# Patient Record
(demographics unavailable — no encounter records)

---

## 2025-01-10 NOTE — CARDIOLOGY SUMMARY
[de-identified] : 01/2025: sinus rhythm, normal intervals [de-identified] : JAMIE:  1. Left ventricular cavity is normal in size. Left ventricular systolic function is normal with an ejection fraction visually estimated at 60 to 65 %.  2. No atrial septal defect detected.  3. Agitated saline injection was negative for intracardiac shunt.  4. Lipomatous interatrial septal hypertrophy present.  5. No evidence of left atrial or left atrial appendage thrombus.  6. There is calcification of the mitral valve annulus.  7. Trileaflet aortic valve with reduced systolic excursion. There is mild calcification of the aortic valve leaflets. Mild aortic stenosis.  TTE Limited W or WO Ultrasound Enhancing Agent (10.21.24 @ 11:38) >  1. Limited TTE for agitated saline study.  2. Agitated saline injection was negative forintracardiac shunt.  < end of copied text > < from: TTE W or WO Ultrasound Enhancing Agent (10.08.24 @ 15:17) >  1. Technically difficult image quality.  2. Left ventricular cavity is normal in size. Left ventricular wall thickness is normal. Left ventricular systolic function is hyperdynamic with an ejection fraction of 82 % by Estrella's method of disks with an ejection fraction visually estimated at >75 %. There are no regional wall motion abnormalities seen.  3. Analysis of left ventricular diastolic function and filling pressure is made challenging by the presence of mitral annular calcification.  4. Hyperdynamic left ventricular systolic function with intra-cavitary gradient of 48 mmHg.  5. There is severe calcification of the mitral valve annulus.  6. Moderate mitral valve stenosis. A hyperdynamic left ventricle contributes to the elevated transmitral gradient.  7. MVA by continuity equation is 1.5 cm2.  8. Mild aortic stenosis.  9. The right ventricle is not well visualized. probably normal right ventricular cavity size and probably normal right ventricular systolic function. 10. The inferior vena cava is normal in size (normal <2.1cm) with normal inspiratory collapse (normal >50%) consistent with normal right atrial pressure (~3, range 0-5mmHg). 11. Pulmonary arterysystolic pressure could not be estimated. 12. No prior echocardiogram is available for comparison.

## 2025-01-10 NOTE — DISCUSSION/SUMMARY
[EKG obtained to assist in diagnosis and management of assessed problem(s)] : EKG obtained to assist in diagnosis and management of assessed problem(s) [FreeTextEntry1] : Patient presents for: + Stage B valvular disease (Mild MS, Mild AS on JAMIE 2024 @ [facility]) Severe MAC Hx of CVA with right hemiparesis s/p ILR (No events 12/2024 interrogation) Morbid obesity hx of tobacco use     Orders placed including imaging/meds:  - Carotid duplex - HERBERT/PVR - lp(a), lipid panel repeat, apo B - goal LDL <70 - recommend low dose bblocker toprol xl 25mg qday - recommend atorvastatin 20mg qday, c/w ASA 81mg qday - ascvd 6.17%  10/2024: LDL-1 167, Non-. , , HDL 38 hba1c 6.1% 10/2024   Patients willingness to continue abstienence is discussed. They will make effort to abstain. We discussed tobacco use of any kind, its impact on the Cardiovascular system including atherosclerosis and inflammatory changes on Cardiovascular system. I've offered them advice on medications, resources online and positive reinforcement including methods on quitting. Spent between 3-10 minutes counseling. We agreed at our next appointment the patient should be either significantly reduced from tobacco products or completely in abstinence.  Resources made available to the patient         Patient warm and euvolemic. There is no evidence of active ACS, decompensated HF, uncontrolled arrhythmias or significant valvular heart disease at present. EKG is non-ischemic. We went over the EKG in office today, all questions answered. Vitals reviewed. Cardiac testing recent reviewed.   I've asked the patient to keep a blood pressure journal and report back if SBP >130 or DBP >90 on 2-3 separate random occasions. The patient is aware of alarm cardiac type symptoms, will call with questions or concerns and is aware to activate 911 and/or present to the closest emergency department if concerns.   Follow up: with me in  6 months or sooner as requested.     I strongly encouraged the patient to pursue the following preventative cardiology, lifestyle modifications which are necessary for long-term cardiovascular health. The following is recommended: Advised a mediterranean and/or plant predominant, high fiber, limited salt (ideal <2 g/day), low fat diet, stress reduction/psychosomatic management, sleep hygiene/PATSY testing and healthy weight loss, annual influenza/preventive vaccines, medication + treatment adherence/compliance, high risk behavior mitigation (I.e. tobacco abstinence, alcohol abstinence, no binge drinking, recreational/illicit drug use abstinence), increased exercise activity aiming for 150 minutes per week of moderate physical activity as per AHA/ACC standard for long term cardiovascular risk reduction.

## 2025-01-10 NOTE — REASON FOR VISIT
[FreeTextEntry1] : 62 y/o female with PMH of substance abuse (cocaine) HTN, HLD, COPD on home of o2 of 3 LNC,  depression, schizophrenia came to the ED complaining of right sided weakness, decrease sensation and slurred speech at [facility] on October 2024.    Admitted for + stroke.  MRI + for  Small acute infarct noted in the left posterior corona radiata/basal ganglia; small acute/subacute infarcts noted in the bilateral posterior frontal parietal region, left greater than right.   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   Vascular opinion -> No acute vascular surgery intervention indicated at this time. Underwent JAMIE: no PFO, normal EF, moderate MS, severe MAC, 2d TTE and ILR placement (12/2024 interrogation No AT/AF/VA). PResents for cardiovascular establishing care.

## 2025-01-10 NOTE — HISTORY OF PRESENT ILLNESS
[FreeTextEntry1] : 64 y/o female with PMH of substance abuse (cocaine) HTN, HLD, COPD on home of o2 of 3 LNC,  depression, schizophrenia came to the ED complaining of right sided weakness, decrease sensation and slurred speech at [facility] on October 2024.    Admitted for + stroke.  MRI + for  Small acute infarct noted in the left posterior corona radiata/basal ganglia; small acute/subacute infarcts noted in the bilateral posterior frontal parietal region, left greater than right.   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   Vascular opinion -> No acute vascular surgery intervention indicated at this time. Underwent JAMIE: no PFO, normal EF, mild-moderate MS (mean grad 6mmHg), severe MAC, 2d TTE and ILR placement (12/2024 interrogation No AT/AF/VA). PResents for cardiovascular establishing care.   has quit smoking since stroke, has urges but plans to remain tobacco abstinent. in rehab and has speech back, still with right hemiparesis and gaining strength, not walking yet still using a walker no cardiac concerns difficulty losing weight, does not want to trial weight loss medications at this time healthier diet now that at rehab. Denies chest pain/pressure, palpitations, irregular and/or rapid heart beat, SOB, BUI, syncope/near syncope, dizziness, orthopnea, PND, cough, edema, f/c, n/v/d, hematuria, or hematochezia. Denies claudication, PAD, venous changes, dizziness, amaroux fugax, vision/sensory changes. underwent dobutamine nuclear stress test 10/2024: no ischemia

## 2025-01-10 NOTE — ASSESSMENT
[FreeTextEntry1] : 64 y/o female with PMH of substance abuse (cocaine) HTN, HLD, COPD on home of o2 of 3 LNC, depression, schizophrenia came to the ED complaining of right sided weakness, decrease sensation and slurred speech at [facility] on October 2024. Admitted for + stroke. MRI + for Small acute infarct noted in the left posterior corona radiata/basal ganglia; small acute/subacute infarcts noted in the bilateral posterior frontal parietal region, left greater than right. 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 Vascular opinion -> No acute vascular surgery intervention indicated at this time. Underwent JAMIE: no PFO, normal EF, moderate MS, severe MAC, 2d TTE and ILR placement (12/2024 interrogation No AT/AF/VA). PResents for cardiovascular establishing care.

## 2025-03-10 NOTE — ASSESSMENT
[FreeTextEntry1] : 64 y/o female with PMH of substance abuse (cocaine) HTN, HLD, COPD on home of o2 of 3 LNC, depression, schizophrenia came to the ED complaining of right sided weakness, decrease sensation and slurred speech at [facility] on October 2024. Admitted for + stroke. MRI + for Small acute infarct noted in the left posterior corona radiata/basal ganglia; small acute/subacute infarcts noted in the bilateral posterior frontal parietal region, left greater than right. 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 Vascular opinion -> No acute vascular surgery intervention indicated at this time. Underwent JAMIE: no PFO, normal EF, moderate MS, severe MAC, 2d TTE and ILR placement (12/2024 interrogation No AT/AF/VA). PResents for cardiovascular establishing care.

## 2025-03-10 NOTE — CARDIOLOGY SUMMARY
[de-identified] : 01/2025: sinus rhythm, normal intervals [de-identified] : JAMIE:  1. Left ventricular cavity is normal in size. Left ventricular systolic function is normal with an ejection fraction visually estimated at 60 to 65 %.  2. No atrial septal defect detected.  3. Agitated saline injection was negative for intracardiac shunt.  4. Lipomatous interatrial septal hypertrophy present.  5. No evidence of left atrial or left atrial appendage thrombus.  6. There is calcification of the mitral valve annulus.  7. Trileaflet aortic valve with reduced systolic excursion. There is mild calcification of the aortic valve leaflets. Mild aortic stenosis.  TTE Limited W or WO Ultrasound Enhancing Agent (10.21.24 @ 11:38) >  1. Limited TTE for agitated saline study.  2. Agitated saline injection was negative forintracardiac shunt.  < end of copied text > < from: TTE W or WO Ultrasound Enhancing Agent (10.08.24 @ 15:17) >  1. Technically difficult image quality.  2. Left ventricular cavity is normal in size. Left ventricular wall thickness is normal. Left ventricular systolic function is hyperdynamic with an ejection fraction of 82 % by Estrella's method of disks with an ejection fraction visually estimated at >75 %. There are no regional wall motion abnormalities seen.  3. Analysis of left ventricular diastolic function and filling pressure is made challenging by the presence of mitral annular calcification.  4. Hyperdynamic left ventricular systolic function with intra-cavitary gradient of 48 mmHg.  5. There is severe calcification of the mitral valve annulus.  6. Moderate mitral valve stenosis. A hyperdynamic left ventricle contributes to the elevated transmitral gradient.  7. MVA by continuity equation is 1.5 cm2.  8. Mild aortic stenosis.  9. The right ventricle is not well visualized. probably normal right ventricular cavity size and probably normal right ventricular systolic function. 10. The inferior vena cava is normal in size (normal <2.1cm) with normal inspiratory collapse (normal >50%) consistent with normal right atrial pressure (~3, range 0-5mmHg). 11. Pulmonary arterysystolic pressure could not be estimated. 12. No prior echocardiogram is available for comparison.

## 2025-03-10 NOTE — PHYSICAL EXAM
[Well Developed] : well developed [No Acute Distress] : no acute distress [Obese] : obese [Normal Conjunctiva] : normal conjunctiva [Normal Venous Pressure] : normal venous pressure [No Carotid Bruit] : no carotid bruit [Normal S1, S2] : normal S1, S2 [No Murmur] : no murmur [No Rub] : no rub [No Gallop] : no gallop [No Respiratory Distress] : no respiratory distress  [Wheeze ____] : wheeze [unfilled] [Soft] : abdomen soft [Non Tender] : non-tender [No Masses/organomegaly] : no masses/organomegaly [Normal Bowel Sounds] : normal bowel sounds [Abnormal Gait] : abnormal gait [No Edema] : no edema [No Cyanosis] : no cyanosis [Normal Radial B/L] : normal radial B/L [No Rash] : no rash [No Skin Lesions] : no skin lesions [Normal Speech] : normal speech [Alert and Oriented] : alert and oriented [Normal memory] : normal memory [de-identified] : 3L NC in place [de-identified] : wheelchair

## 2025-03-10 NOTE — DISCUSSION/SUMMARY
[FreeTextEntry1] : Patient presents for: carotid disease, L>R, Left ECA >75%, ICA 50-69% (2025) + Stage B valvular disease (Mild MS, Mild AS on JAMIE 2024 @ [facility]) Severe MAC Hx of CVA with right hemiparesis s/p ILR (No events 12/2024 interrogation) Morbid obesity hx of tobacco use     Orders placed including imaging/meds: - HERBERT/PVR - Vascular surgery referral due to carotid disease - goal LDL <70 - recommend low dose bblocker toprol xl 25mg qday - recommend atorvastatin 80mg qday, c/w ASA 81mg qday - ascvd 6.17%  10/2024: LDL-1 167, Non-. , , HDL 38 hba1c 6.1% 10/2024   Patient warm and euvolemic. There is no evidence of active ACS, decompensated HF, uncontrolled arrhythmias or significant valvular heart disease at present. EKG is non-ischemic. We went over the EKG in office today, all questions answered. Vitals reviewed. Cardiac testing recent reviewed.  I have instructed the patient to follow a 1200 calories meal plan emphasizing low saturated fat sources and protein with low amount of sodium.  Information on avoiding fast food, fried foods, food with high fat content was suggested, plant based low fat, high fiber diet. We reviewed the efforts of weight reduction identifying 3500 calories in pound of body fat and the need to gradually achieve a long term basis for weight reduction discussed the need to reduce calories for what her current patterns are and to hopefully increase physical activity as well. We discussed menu selection as well as food preparation techniques. Educated patient on 150 minutes of moderate intensity exercise weekly with strength training twice weekly. Encouraged patient to monitor physical activity Discussed the importance of a balanced, healthy diet of nourishing foods and consistent meal pattern for long-term success and health maintenance. Encouraged to increase water intake to facilitate adequate hydration and to cut out sugary drinks and alcohol. Pt educated on eating 4-6 small meals per day, that are high in protein for hunger regulation. Pt educated on cutting out high-sugar content foods sabotaging wt loss. Pt verbalized understanding. we discussed obesity implications with general health and cardiovascular process. I discussed in detail issues that can potentially arise with the cardiovascular and general health system. Consideration of bariatric surgery, weight loss medication and nutritional referral offered.   I've asked the patient to keep a blood pressure journal and report back if SBP >130 or DBP >90 on 2-3 separate random occasions. The patient is aware of alarm cardiac type symptoms, will call with questions or concerns and is aware to activate 911 and/or present to the closest emergency department if concerns.   Follow up: with me in 6 months or sooner as requested.     I strongly encouraged the patient to pursue the following preventative cardiology, lifestyle modifications which are necessary for long-term cardiovascular health. The following is recommended: Advised a mediterranean and/or plant predominant, high fiber, limited salt (ideal <2 g/day), low fat diet, stress reduction/psychosomatic management, sleep hygiene/PATSY testing and healthy weight loss, annual influenza/preventive vaccines, medication + treatment adherence/compliance, high risk behavior mitigation (I.e. tobacco abstinence, alcohol abstinence, no binge drinking, recreational/illicit drug use abstinence), increased exercise activity aiming for 150 minutes per week of moderate physical activity as per AHA/ACC standard for long term cardiovascular risk reduction. [EKG obtained to assist in diagnosis and management of assessed problem(s)] : EKG obtained to assist in diagnosis and management of assessed problem(s)

## 2025-03-10 NOTE — REASON FOR VISIT
[FreeTextEntry1] : 64 y/o female with PMH of substance abuse (cocaine) HTN, HLD, COPD on home of o2 of 3 LNC,  depression, schizophrenia came to the ED complaining of right sided weakness, decrease sensation and slurred speech at [facility] on October 2024.    Admitted for + stroke.  MRI + for  Small acute infarct noted in the left posterior corona radiata/basal ganglia; small acute/subacute infarcts noted in the bilateral posterior frontal parietal region, left greater than right.   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   Vascular opinion -> No acute vascular surgery intervention indicated at this time. Underwent JAMIE: no PFO, normal EF, moderate MS, severe MAC, 2d TTE and ILR placement (12/2024 interrogation No AT/AF/VA). PResents for cardiovascular establishing care.

## 2025-03-20 NOTE — ASSESSMENT
[FreeTextEntry1] : A: 63-year-old female with left ICA stenosis 50 to 69%.  Patient had stroke back in October.  This suggests possibly symptomatic carotid stenosis. Also, I reviewed CTA from October.  The patient has at least 70% stenosis of the proximal right internal carotid artery stenosis as well.  P: Start Plavix 75 mg daily. Will obtain CTA neck and head for better evaluation of carotid artery disease. Patient to follow-up after imaging to review and discuss further management.

## 2025-03-20 NOTE — HISTORY OF PRESENT ILLNESS
[FreeTextEntry1] : 63-year-old female with past medical history of stroke, COPD on home oxygen, hyperlipidemia, obesity, PATSY who was referred to vascular surgery clinic for carotid artery stenosis.  Patient had carotid duplex showing 50-69% left ICA stenosis.  She reports history of stroke in October.  This affected her right side.  Since then, the patient has urinary incontinence and is mostly getting around with a wheelchair.  She has right upper extremity weakness.  Some weakness in the right leg however she is antigravity.  Patient is currently on aspirin and statin.  She quit smoking back in October where she had the stroke.  Before that she was smoking at least 2 packs a day for long period of time.  The patient lives in a rehab.

## 2025-03-20 NOTE — PHYSICAL EXAM
[Calm] : calm [de-identified] : Alert and oriented x 3, no acute distress [de-identified] : Nonlabored breaths, on continuous oxygen via nasal cannula [de-identified] : DORISR [FreeTextEntry1] : Palpable femoral pulses bilaterally Palpable posterior tibial pulse bilaterally [de-identified] : Soft, nontender, nondistended, obese [de-identified] : Right upper extremity weakness 3 out of 5 Right lower extremity weakness 4 out of 5 No weakness in the left upper or lower extremity [de-identified] : Cranial nerves II to XII gross intact

## 2025-04-24 NOTE — HISTORY OF PRESENT ILLNESS
[FreeTextEntry1] : 63-year-old female comes to the clinic for follow-up and CT results.  She denies any new strokelike symptoms.  CTA was reviewed.  The patient has around 50% stenosis of the right ICA and around 6% stenosis of the left ICA.  Has not had a stroke since October therefore she has been asymptomatic for more than 6 months.  She is currently on aspirin Plavix and statin.  She is not smoking

## 2025-04-24 NOTE — PHYSICAL EXAM
[FreeTextEntry1] : General Appearance: Alert and oriented x 3, no acute distress.   Respiratory: Nonlabored breaths, on continuous oxygen via nasal cannula.   Cardiovascular:. RRR. Palpable femoral pulses bilaterally Palpable posterior tibial pulse bilaterally.   Gastrointestinal:. Soft, nontender, nondistended, obese.   Musculoskeletal: Right upper extremity weakness 3 out of 5 Right lower extremity weakness 4 out of 5 No weakness in the left upper or lower extremity.   Neurologic:. Cranial nerves II to XII gross intact.   Psychiatric: calm.

## 2025-04-24 NOTE — ASSESSMENT
[FreeTextEntry1] : A: 63-year-old female with bilateral carotid artery stenosis. Right ICA stenosis around 50%. Left ICA stenosis around 60%. Patient with no new neurological events.  P: At this point no surgical intervention indicated for carotid artery disease based on degree of stenosis and the fact that she has been asymptomatic for more than 6 months. Continue aspirin Plavix statin. Will continue with surveillance of carotid artery stenosis. RTC 6 months for carotid duplex or earlier if necessary.

## 2025-07-18 NOTE — CARDIOLOGY SUMMARY
[de-identified] : 01/2025: sinus rhythm, normal intervals [de-identified] : JAMIE:  1. Left ventricular cavity is normal in size. Left ventricular systolic function is normal with an ejection fraction visually estimated at 60 to 65 %.  2. No atrial septal defect detected.  3. Agitated saline injection was negative for intracardiac shunt.  4. Lipomatous interatrial septal hypertrophy present.  5. No evidence of left atrial or left atrial appendage thrombus.  6. There is calcification of the mitral valve annulus.  7. Trileaflet aortic valve with reduced systolic excursion. There is mild calcification of the aortic valve leaflets. Mild aortic stenosis.  TTE Limited W or WO Ultrasound Enhancing Agent (10.21.24 @ 11:38) >  1. Limited TTE for agitated saline study.  2. Agitated saline injection was negative forintracardiac shunt.  < end of copied text > < from: TTE W or WO Ultrasound Enhancing Agent (10.08.24 @ 15:17) >  1. Technically difficult image quality.  2. Left ventricular cavity is normal in size. Left ventricular wall thickness is normal. Left ventricular systolic function is hyperdynamic with an ejection fraction of 82 % by Estrella's method of disks with an ejection fraction visually estimated at >75 %. There are no regional wall motion abnormalities seen.  3. Analysis of left ventricular diastolic function and filling pressure is made challenging by the presence of mitral annular calcification.  4. Hyperdynamic left ventricular systolic function with intra-cavitary gradient of 48 mmHg.  5. There is severe calcification of the mitral valve annulus.  6. Moderate mitral valve stenosis. A hyperdynamic left ventricle contributes to the elevated transmitral gradient.  7. MVA by continuity equation is 1.5 cm2.  8. Mild aortic stenosis.  9. The right ventricle is not well visualized. probably normal right ventricular cavity size and probably normal right ventricular systolic function. 10. The inferior vena cava is normal in size (normal <2.1cm) with normal inspiratory collapse (normal >50%) consistent with normal right atrial pressure (~3, range 0-5mmHg). 11. Pulmonary arterysystolic pressure could not be estimated. 12. No prior echocardiogram is available for comparison.

## 2025-07-18 NOTE — HISTORY OF PRESENT ILLNESS
[FreeTextEntry1] : 64 y/o female with PMH of substance abuse (cocaine) HTN, HLD, COPD on home of o2 of 3 LNC,  depression, schizophrenia came to the ED complaining of right sided weakness, decrease sensation and slurred speech at [facility] on October 2024.    Admitted for + stroke.  MRI + for  Small acute infarct noted in the left posterior corona radiata/basal ganglia; small acute/subacute infarcts noted in the bilateral posterior frontal parietal region, left greater than right.   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   Vascular opinion -> No acute vascular surgery intervention indicated at this time. Underwent JAMIE: no PFO, normal EF, mild-moderate MS (mean grad 6mmHg), severe MAC, 2d TTE and ILR placement (12/2024 interrogation No AT/AF/VA). PResents for cardiovascular establishing care.   has quit smoking since stroke, has urges but plans to remain tobacco abstinent. in rehab and has speech back, still with right hemiparesis and gaining strength, not walking yet still using a walker no cardiac concerns difficulty losing weight, does not want to trial weight loss medications at this time healthier diet now that at rehab. Denies chest pain/pressure, palpitations, irregular and/or rapid heart beat, SOB, BUI, syncope/near syncope, dizziness, orthopnea, PND, cough, edema, f/c, n/v/d, hematuria, or hematochezia. Denies claudication, PAD, venous changes, dizziness, amaroux fugax, vision/sensory changes. underwent dobutamine nuclear stress test 10/2024: no ischemia   03/2025: returns for follow up 7 pound weight loss on ozempic carotid duplex: Left ECA >75% st[place], ICA 50-69%, mild right carotid stenosis asymptomatic, still in rehab tobacco abstinence

## 2025-07-18 NOTE — DISCUSSION/SUMMARY
[FreeTextEntry1] : Patient presents for: carotid disease, L>R, Left ECA >75%, ICA 50-69% (2025) + Stage B valvular disease (Mild MS, Mild AS on JAMIE 2024 @ [facility]) Severe MAC Hx of CVA with right hemiparesis s/p ILR (No events 12/2024 interrogation) Morbid obesity hx of tobacco use     Orders placed including imaging/meds: - HERBERT/PVR - Vascular surgery referral due to carotid disease - goal LDL <70 - recommend low dose bblocker toprol xl 25mg qday - recommend atorvastatin 80mg qday, c/w ASA 81mg qday - ascvd 6.17%  10/2024: LDL-1 167, Non-. , , HDL 38 hba1c 6.1% 10/2024   Patient warm and euvolemic. There is no evidence of active ACS, decompensated HF, uncontrolled arrhythmias or significant valvular heart disease at present. EKG is non-ischemic. We went over the EKG in office today, all questions answered. Vitals reviewed. Cardiac testing recent reviewed.  I have instructed the patient to follow a 1200 calories meal plan emphasizing low saturated fat sources and protein with low amount of sodium.  Information on avoiding fast food, fried foods, food with high fat content was suggested, plant based low fat, high fiber diet. We reviewed the efforts of weight reduction identifying 3500 calories in pound of body fat and the need to gradually achieve a long term basis for weight reduction discussed the need to reduce calories for what her current patterns are and to hopefully increase physical activity as well. We discussed menu selection as well as food preparation techniques. Educated patient on 150 minutes of moderate intensity exercise weekly with strength training twice weekly. Encouraged patient to monitor physical activity Discussed the importance of a balanced, healthy diet of nourishing foods and consistent meal pattern for long-term success and health maintenance. Encouraged to increase water intake to facilitate adequate hydration and to cut out sugary drinks and alcohol. Pt educated on eating 4-6 small meals per day, that are high in protein for hunger regulation. Pt educated on cutting out high-sugar content foods sabotaging wt loss. Pt verbalized understanding. we discussed obesity implications with general health and cardiovascular process. I discussed in detail issues that can potentially arise with the cardiovascular and general health system. Consideration of bariatric surgery, weight loss medication and nutritional referral offered.   I've asked the patient to keep a blood pressure journal and report back if SBP >130 or DBP >90 on 2-3 separate random occasions. The patient is aware of alarm cardiac type symptoms, will call with questions or concerns and is aware to activate 911 and/or present to the closest emergency department if concerns.   Follow up: with me in 6 months or sooner as requested.     I strongly encouraged the patient to pursue the following preventative cardiology, lifestyle modifications which are necessary for long-term cardiovascular health. The following is recommended: Advised a mediterranean and/or plant predominant, high fiber, limited salt (ideal <2 g/day), low fat diet, stress reduction/psychosomatic management, sleep hygiene/PATSY testing and healthy weight loss, annual influenza/preventive vaccines, medication + treatment adherence/compliance, high risk behavior mitigation (I.e. tobacco abstinence, alcohol abstinence, no binge drinking, recreational/illicit drug use abstinence), increased exercise activity aiming for 150 minutes per week of moderate physical activity as per AHA/ACC standard for long term cardiovascular risk reduction.

## 2025-07-18 NOTE — ASSESSMENT
[FreeTextEntry1] : 64 y/o female with PMH of substance abuse (cocaine) HTN, HLD, COPD on home of o2 of 3 LNC, depression, schizophrenia came to the ED complaining of right sided weakness, decrease sensation and slurred speech at [facility] on October 2024. Admitted for + stroke. MRI + for Small acute infarct noted in the left posterior corona radiata/basal ganglia; small acute/subacute infarcts noted in the bilateral posterior frontal parietal region, left greater than right. 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 Vascular opinion -> No acute vascular surgery intervention indicated at this time. Underwent JAMIE: no PFO, normal EF, moderate MS, severe MAC, 2d TTE and ILR placement (12/2024 interrogation No AT/AF/VA). PResents for cardiovascular establishing care.

## 2025-07-18 NOTE — PHYSICAL EXAM
[Well Developed] : well developed [No Acute Distress] : no acute distress [Obese] : obese [Normal Conjunctiva] : normal conjunctiva [Normal Venous Pressure] : normal venous pressure [No Carotid Bruit] : no carotid bruit [Normal S1, S2] : normal S1, S2 [No Murmur] : no murmur [No Rub] : no rub [No Gallop] : no gallop [No Respiratory Distress] : no respiratory distress  [Wheeze ____] : wheeze [unfilled] [Soft] : abdomen soft [Non Tender] : non-tender [No Masses/organomegaly] : no masses/organomegaly [Normal Bowel Sounds] : normal bowel sounds [Abnormal Gait] : abnormal gait [No Edema] : no edema [No Cyanosis] : no cyanosis [Normal Radial B/L] : normal radial B/L [No Rash] : no rash [No Skin Lesions] : no skin lesions [Normal Speech] : normal speech [Alert and Oriented] : alert and oriented [Normal memory] : normal memory [de-identified] : 3L NC in place [de-identified] : wheelchair

## 2025-07-18 NOTE — REASON FOR VISIT
[FreeTextEntry1] : 62 y/o female with PMH of substance abuse (cocaine) HTN, HLD, COPD on home of o2 of 3 LNC,  depression, schizophrenia came to the ED complaining of right sided weakness, decrease sensation and slurred speech at [facility] on October 2024.    Admitted for + stroke.  MRI + for  Small acute infarct noted in the left posterior corona radiata/basal ganglia; small acute/subacute infarcts noted in the bilateral posterior frontal parietal region, left greater than right.   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   Vascular opinion -> No acute vascular surgery intervention indicated at this time. Underwent JAMIE: no PFO, normal EF, moderate MS, severe MAC, 2d TTE and ILR placement (12/2024 interrogation No AT/AF/VA). PResents for cardiovascular establishing care.